# Patient Record
Sex: FEMALE | Race: BLACK OR AFRICAN AMERICAN | NOT HISPANIC OR LATINO | URBAN - METROPOLITAN AREA
[De-identification: names, ages, dates, MRNs, and addresses within clinical notes are randomized per-mention and may not be internally consistent; named-entity substitution may affect disease eponyms.]

---

## 2017-01-16 ENCOUNTER — EMERGENCY (EMERGENCY)
Facility: HOSPITAL | Age: 52
LOS: 1 days | Discharge: HOME | End: 2017-01-16
Attending: EMERGENCY MEDICINE | Admitting: EMERGENCY MEDICINE
Payer: COMMERCIAL

## 2017-01-16 VITALS
HEART RATE: 76 BPM | DIASTOLIC BLOOD PRESSURE: 80 MMHG | SYSTOLIC BLOOD PRESSURE: 133 MMHG | OXYGEN SATURATION: 100 % | TEMPERATURE: 98 F | RESPIRATION RATE: 16 BRPM

## 2017-01-16 VITALS
OXYGEN SATURATION: 100 % | DIASTOLIC BLOOD PRESSURE: 119 MMHG | TEMPERATURE: 99 F | SYSTOLIC BLOOD PRESSURE: 196 MMHG | HEART RATE: 74 BPM | RESPIRATION RATE: 18 BRPM

## 2017-01-16 DIAGNOSIS — Z98.89 OTHER SPECIFIED POSTPROCEDURAL STATES: Chronic | ICD-10-CM

## 2017-01-16 DIAGNOSIS — Z90.710 ACQUIRED ABSENCE OF BOTH CERVIX AND UTERUS: Chronic | ICD-10-CM

## 2017-01-16 LAB
ALBUMIN SERPL ELPH-MCNC: 4.3 G/DL — SIGNIFICANT CHANGE UP (ref 3.3–5)
ALP SERPL-CCNC: 67 U/L — SIGNIFICANT CHANGE UP (ref 40–120)
ALT FLD-CCNC: 32 U/L — SIGNIFICANT CHANGE UP (ref 4–33)
APTT BLD: 32.8 SEC — SIGNIFICANT CHANGE UP (ref 27.5–37.4)
AST SERPL-CCNC: 22 U/L — SIGNIFICANT CHANGE UP (ref 4–32)
BASOPHILS # BLD AUTO: 0.03 K/UL — SIGNIFICANT CHANGE UP (ref 0–0.2)
BASOPHILS NFR BLD AUTO: 0.4 % — SIGNIFICANT CHANGE UP (ref 0–2)
BILIRUB SERPL-MCNC: 0.3 MG/DL — SIGNIFICANT CHANGE UP (ref 0.2–1.2)
BUN SERPL-MCNC: 11 MG/DL — SIGNIFICANT CHANGE UP (ref 7–23)
CALCIUM SERPL-MCNC: 9.4 MG/DL — SIGNIFICANT CHANGE UP (ref 8.4–10.5)
CHLORIDE SERPL-SCNC: 105 MMOL/L — SIGNIFICANT CHANGE UP (ref 98–107)
CO2 SERPL-SCNC: 24 MMOL/L — SIGNIFICANT CHANGE UP (ref 22–31)
CREAT SERPL-MCNC: 0.75 MG/DL — SIGNIFICANT CHANGE UP (ref 0.5–1.3)
EOSINOPHIL # BLD AUTO: 0.13 K/UL — SIGNIFICANT CHANGE UP (ref 0–0.5)
EOSINOPHIL NFR BLD AUTO: 1.9 % — SIGNIFICANT CHANGE UP (ref 0–6)
GLUCOSE SERPL-MCNC: 102 MG/DL — HIGH (ref 70–99)
HCT VFR BLD CALC: 46.3 % — HIGH (ref 34.5–45)
HGB BLD-MCNC: 15.2 G/DL — SIGNIFICANT CHANGE UP (ref 11.5–15.5)
IMM GRANULOCYTES NFR BLD AUTO: 0.3 % — SIGNIFICANT CHANGE UP (ref 0–1.5)
INR BLD: 1.1 — SIGNIFICANT CHANGE UP (ref 0.87–1.18)
LYMPHOCYTES # BLD AUTO: 2.04 K/UL — SIGNIFICANT CHANGE UP (ref 1–3.3)
LYMPHOCYTES # BLD AUTO: 29.7 % — SIGNIFICANT CHANGE UP (ref 13–44)
MCHC RBC-ENTMCNC: 28.1 PG — SIGNIFICANT CHANGE UP (ref 27–34)
MCHC RBC-ENTMCNC: 32.8 % — SIGNIFICANT CHANGE UP (ref 32–36)
MCV RBC AUTO: 85.6 FL — SIGNIFICANT CHANGE UP (ref 80–100)
MONOCYTES # BLD AUTO: 0.43 K/UL — SIGNIFICANT CHANGE UP (ref 0–0.9)
MONOCYTES NFR BLD AUTO: 6.3 % — SIGNIFICANT CHANGE UP (ref 2–14)
NEUTROPHILS # BLD AUTO: 4.23 K/UL — SIGNIFICANT CHANGE UP (ref 1.8–7.4)
NEUTROPHILS NFR BLD AUTO: 61.4 % — SIGNIFICANT CHANGE UP (ref 43–77)
PLATELET # BLD AUTO: 265 K/UL — SIGNIFICANT CHANGE UP (ref 150–400)
PMV BLD: 12.3 FL — SIGNIFICANT CHANGE UP (ref 7–13)
POTASSIUM SERPL-MCNC: 4.3 MMOL/L — SIGNIFICANT CHANGE UP (ref 3.5–5.3)
POTASSIUM SERPL-SCNC: 4.3 MMOL/L — SIGNIFICANT CHANGE UP (ref 3.5–5.3)
PROT SERPL-MCNC: 8.2 G/DL — SIGNIFICANT CHANGE UP (ref 6–8.3)
PROTHROM AB SERPL-ACNC: 12.6 SEC — SIGNIFICANT CHANGE UP (ref 10–13.1)
RBC # BLD: 5.41 M/UL — HIGH (ref 3.8–5.2)
RBC # FLD: 12.7 % — SIGNIFICANT CHANGE UP (ref 10.3–14.5)
SODIUM SERPL-SCNC: 144 MMOL/L — SIGNIFICANT CHANGE UP (ref 135–145)
WBC # BLD: 6.88 K/UL — SIGNIFICANT CHANGE UP (ref 3.8–10.5)
WBC # FLD AUTO: 6.88 K/UL — SIGNIFICANT CHANGE UP (ref 3.8–10.5)

## 2017-01-16 PROCEDURE — 99284 EMERGENCY DEPT VISIT MOD MDM: CPT

## 2017-01-16 RX ORDER — ACETAMINOPHEN 500 MG
975 TABLET ORAL ONCE
Qty: 0 | Refills: 0 | Status: COMPLETED | OUTPATIENT
Start: 2017-01-16 | End: 2017-01-16

## 2017-01-16 RX ORDER — CEPHALEXIN 500 MG
1 CAPSULE ORAL
Qty: 14 | Refills: 0 | OUTPATIENT
Start: 2017-01-16 | End: 2017-01-23

## 2017-01-16 RX ORDER — LABETALOL HCL 100 MG
10 TABLET ORAL ONCE
Qty: 0 | Refills: 0 | Status: DISCONTINUED | OUTPATIENT
Start: 2017-01-16 | End: 2017-01-16

## 2017-01-16 RX ADMIN — Medication 975 MILLIGRAM(S): at 17:19

## 2017-01-16 NOTE — ED PROVIDER NOTE - OBJECTIVE STATEMENT
51 YOF PMH transcranial brain surgery on ABX prophylaxis p/w 3 hours of epistaxis today.  Associated hypertension.  Bleeding constant. Pt has tried pressure without relief. 51 YOF PMH transcranial brain surgery on ABX prophylaxis p/w 3 hours of epistaxis today.  Associated hypertension.  Bleeding constant. Pt has tried pressure without relief.  EMS called Pt brought to the ED.  No chest pain or SOB.  No trauma.

## 2017-01-16 NOTE — ED PROVIDER NOTE - ATTENDING CONTRIBUTION TO CARE
BECKY LAW, ATTENDING NOTE:  52 yo F brought in by EMS with past medical history of brain surgery on Abx prophylaxis presents with 3 hours of epistaxis from both nares today, currently only LEFT.  Patient tried pressure without relief..  No chest pain.  No trauma or injury.  Patient is awake and alert and in no acute distress.  Normocephalic/atraumatic.  Auricles are normal.  Neck supple. Anterior bleeding from the LEFT nostril. Lungs CTAB, no wheeze, no rhonchi,  no rales.  Heart is regular rate and rhythm.  Abdomen is soft, not distended +BS.  Back is nontender, no CVAT.  Moving all 4 extremities.   Neurologically grossly intact.  Affect is appropriate.   DR. LAW, ATTENDING MD-  I performed a face to face bedside interview with patient regarding history of present illness, review of symptoms and past medical history. I completed an independent physical exam.  I have discussed patient's plan of care with Dr. Pacheco.   I agree with note as stated above, having amended the EMR as needed to reflect my findings. I have discussed the assessment and plan of care.  This includes during the time I functioned as the attending physician for this patient.

## 2017-01-16 NOTE — ED ADULT NURSE NOTE - OBJECTIVE STATEMENT
Facilitator RN note: Received pt into spot #2. Pt A/o x 3 calm cooperative. Active bleeding from bilateral nostrils. Bright red blood noted to basin with clots. Pt denies being on any anticoagulants. Denies pain. Denies nausea, SOB or dizziness.. Pt eval by Dr. Santos. compression applied to nose by using 2 tongue depressors , applied by Dr. Santos. Bloodwork obtained and sent as ordered. Report given to primary RN.

## 2017-01-16 NOTE — ED ADULT TRIAGE NOTE - CHIEF COMPLAINT QUOTE
Pt c/o sudden onset epistaxis 1 hour ago. Pt hypertensive, reports compliance w/ meds. Denies anticoagulant use.  PMH: HTN

## 2017-01-16 NOTE — ED PROVIDER NOTE - MEDICAL DECISION MAKING DETAILS
51 YOF PMH transcranial brain surgery on ABX prophylaxis p/w 3 hours of epistaxis today. HTN.  VS otherwise stable.  Attempt pressure packing.  If no resolution, tamponade.  Blood pressure control as required.

## 2017-01-16 NOTE — ED PROVIDER NOTE - PROGRESS NOTE DETAILS
JW Anterior tamponade device placed.  Hemostasis achieved.  BP now 140 systolic.  Observe, reassess.  Likely discharge with ENT follow up. NATE Pt stable.  Pt discharged instructed to follow up with ENT in the next 24 hours.  Pt instructed to continue prophylaxis.  I reviewed the alarm symptoms of this patient's diagnosis and discussed criteria for their return to the emergency department.  I instructed the patient to return to the emergency department with any alarm symptoms for their specific diagnosis including fevers, chills, sweats, bleeding, and dizziness, any worsening symptoms, and any other concerns.  I instructed this patient to call their primary doctor today, to inform them of their visit to the emergency department, and to obtain a repeat evaluation in the next 24 hours.  This patient understood and agreed with our plan for follow up and felt safe returning home.  At the time of discharge this patient remained in stable condition, in no acute distress, with stable vital signs.

## 2017-01-17 ENCOUNTER — EMERGENCY (EMERGENCY)
Facility: HOSPITAL | Age: 52
LOS: 1 days | Discharge: HOME | End: 2017-01-17
Attending: EMERGENCY MEDICINE | Admitting: EMERGENCY MEDICINE
Payer: COMMERCIAL

## 2017-01-17 VITALS
SYSTOLIC BLOOD PRESSURE: 153 MMHG | DIASTOLIC BLOOD PRESSURE: 98 MMHG | OXYGEN SATURATION: 100 % | HEART RATE: 74 BPM | TEMPERATURE: 99 F | RESPIRATION RATE: 16 BRPM

## 2017-01-17 VITALS
DIASTOLIC BLOOD PRESSURE: 110 MMHG | TEMPERATURE: 99 F | OXYGEN SATURATION: 99 % | SYSTOLIC BLOOD PRESSURE: 173 MMHG | HEART RATE: 75 BPM | RESPIRATION RATE: 18 BRPM

## 2017-01-17 DIAGNOSIS — R51 HEADACHE: ICD-10-CM

## 2017-01-17 DIAGNOSIS — R04.0 EPISTAXIS: ICD-10-CM

## 2017-01-17 DIAGNOSIS — Z90.710 ACQUIRED ABSENCE OF BOTH CERVIX AND UTERUS: Chronic | ICD-10-CM

## 2017-01-17 DIAGNOSIS — Z98.89 OTHER SPECIFIED POSTPROCEDURAL STATES: Chronic | ICD-10-CM

## 2017-01-17 DIAGNOSIS — Z88.0 ALLERGY STATUS TO PENICILLIN: ICD-10-CM

## 2017-01-17 DIAGNOSIS — E78.00 PURE HYPERCHOLESTEROLEMIA, UNSPECIFIED: ICD-10-CM

## 2017-01-17 DIAGNOSIS — Z90.710 ACQUIRED ABSENCE OF BOTH CERVIX AND UTERUS: ICD-10-CM

## 2017-01-17 DIAGNOSIS — I10 ESSENTIAL (PRIMARY) HYPERTENSION: ICD-10-CM

## 2017-01-17 DIAGNOSIS — Z98.890 OTHER SPECIFIED POSTPROCEDURAL STATES: ICD-10-CM

## 2017-01-17 PROCEDURE — 70450 CT HEAD/BRAIN W/O DYE: CPT

## 2017-01-17 PROCEDURE — 99284 EMERGENCY DEPT VISIT MOD MDM: CPT | Mod: 25

## 2017-01-17 PROCEDURE — 70450 CT HEAD/BRAIN W/O DYE: CPT | Mod: 26

## 2017-01-17 PROCEDURE — 99284 EMERGENCY DEPT VISIT MOD MDM: CPT

## 2017-01-17 RX ORDER — ACETAMINOPHEN 500 MG
975 TABLET ORAL ONCE
Qty: 0 | Refills: 0 | Status: COMPLETED | OUTPATIENT
Start: 2017-01-17 | End: 2017-01-17

## 2017-01-17 RX ADMIN — Medication 975 MILLIGRAM(S): at 18:16

## 2017-01-17 NOTE — ED PROVIDER NOTE - PROGRESS NOTE DETAILS
Patient bleeding stopped. went for ct head. Patient returned from ct and coughed and left rhino rocket came out. Patient nasal spray phenylephrine placed in left nare with pressure. ENT at bedside and scoped patient. there is mid/distal bleeding source. patient repacked by ENT. Patient has had no additional bleeding.

## 2017-01-17 NOTE — ED ADULT NURSE NOTE - ADDITIONAL PRINTED INSTRUCTIONS GIVEN
** seen by ENT attending, no further bleeding noted,  present during ENT consult, vitals stable, ambualtory, steady gait, denies c/o

## 2017-01-17 NOTE — ED PROVIDER NOTE - MEDICAL DECISION MAKING DETAILS
Kendall: 51 year old female with epistaxis and sent to ER for ct head to r/o recurrence of tumor.   will get ct head, patient restarted bleeding in ER. will apply pressure.

## 2017-01-17 NOTE — ED PROVIDER NOTE - NS ED MD SCRIBE ATTENDING SCRIBE SECTIONS
PAST MEDICAL/SURGICAL/SOCIAL HISTORY/HISTORY OF PRESENT ILLNESS/REVIEW OF SYSTEMS/OBSERVATION MONITORING PLAN/PHYSICAL EXAM/HIV/VITAL SIGNS( Pullset)

## 2017-01-17 NOTE — ED PROVIDER NOTE - CONSTITUTIONAL, MLM
normal... Well appearing, well nourished, awake, alert, oriented to person, place, time/situation and in mild distress. well nourished, awake, alert, oriented to person, place, time/situation and in mild distress.

## 2017-01-17 NOTE — ED ADULT NURSE REASSESSMENT NOTE - NS ED NURSE REASSESS COMMENT FT1
resting in bed,  present, vitals stable, dispo pending, L nare packing in place, denies further epistaxis, neuro exam wnl, wants to take keppra 750 mg

## 2017-01-17 NOTE — ED ADULT NURSE NOTE - OBJECTIVE STATEMENT
pt present to m/w from md office pt had left nare packed at [facility] yesterday for epistaxis pt hx 2 brain surgeries for benign tumor hx htn presents with htn h/a and feels continued blood dripping posterior throat no chest pain denies sob

## 2017-01-17 NOTE — ED PROVIDER NOTE - OBJECTIVE STATEMENT
52 y/o female with PMHx of HLD, HTN and Benign Brain Tumor presents to the ED c/o epistaxis and HA x1 day. Pt reports she was seen yesterday at [facility] where they packed her left nostril. Reports she saw her PMD who advised the pt to come to the ED for a CT scan to rule out a possible recurrent brain tumor. Reports the blood is dripping down into her throat.  Reports she's had two brain surgeries in 2009 and 2013 for tumor removal and was given full body radiation in 2013. Denies fever, chills, n/v/d.   MEDS: Divan, Atenolol, and Keppra. 50 y/o female with PMHx of HTN, HLD, and Benign Brain Tumor s/p craniotomy (2013) presents to the ED c/o epistaxis and HA x1 day. Pt reports she was seen yesterday at [facility] where they packed her left nostril with rhinorocket for epistaxis. Reports she saw her PMD who advised the pt to come to the ED for a CT scan to evaluate brain. Reports the blood is dripping down into her throat that just started while in ER.  Reports she's had two craniotomies in 2009 and 2013 and  radiation in 2013. Denies fever, chills, n/v/d.   MEDS: Divan, Atenolol, and Keppra.

## 2017-04-12 ENCOUNTER — EMERGENCY (EMERGENCY)
Facility: HOSPITAL | Age: 52
LOS: 1 days | Discharge: HOME | End: 2017-04-12
Attending: EMERGENCY MEDICINE | Admitting: EMERGENCY MEDICINE
Payer: COMMERCIAL

## 2017-04-12 VITALS
HEART RATE: 76 BPM | TEMPERATURE: 98 F | OXYGEN SATURATION: 97 % | DIASTOLIC BLOOD PRESSURE: 93 MMHG | RESPIRATION RATE: 18 BRPM | SYSTOLIC BLOOD PRESSURE: 151 MMHG

## 2017-04-12 VITALS
DIASTOLIC BLOOD PRESSURE: 96 MMHG | TEMPERATURE: 98 F | RESPIRATION RATE: 16 BRPM | HEART RATE: 64 BPM | SYSTOLIC BLOOD PRESSURE: 158 MMHG | OXYGEN SATURATION: 98 %

## 2017-04-12 DIAGNOSIS — R09.81 NASAL CONGESTION: ICD-10-CM

## 2017-04-12 DIAGNOSIS — Z90.710 ACQUIRED ABSENCE OF BOTH CERVIX AND UTERUS: Chronic | ICD-10-CM

## 2017-04-12 DIAGNOSIS — Z98.89 OTHER SPECIFIED POSTPROCEDURAL STATES: Chronic | ICD-10-CM

## 2017-04-12 LAB
ALBUMIN SERPL ELPH-MCNC: 4.1 G/DL — SIGNIFICANT CHANGE UP (ref 3.3–5)
ALP SERPL-CCNC: 69 U/L — SIGNIFICANT CHANGE UP (ref 40–120)
ALT FLD-CCNC: 23 U/L RC — SIGNIFICANT CHANGE UP (ref 10–45)
ANION GAP SERPL CALC-SCNC: 11 MMOL/L — SIGNIFICANT CHANGE UP (ref 5–17)
AST SERPL-CCNC: 20 U/L — SIGNIFICANT CHANGE UP (ref 10–40)
BASOPHILS # BLD AUTO: 0.1 K/UL — SIGNIFICANT CHANGE UP (ref 0–0.2)
BASOPHILS NFR BLD AUTO: 0.7 % — SIGNIFICANT CHANGE UP (ref 0–2)
BILIRUB SERPL-MCNC: 0.2 MG/DL — SIGNIFICANT CHANGE UP (ref 0.2–1.2)
BUN SERPL-MCNC: 13 MG/DL — SIGNIFICANT CHANGE UP (ref 7–23)
CALCIUM SERPL-MCNC: 9.2 MG/DL — SIGNIFICANT CHANGE UP (ref 8.4–10.5)
CHLORIDE SERPL-SCNC: 102 MMOL/L — SIGNIFICANT CHANGE UP (ref 96–108)
CO2 SERPL-SCNC: 26 MMOL/L — SIGNIFICANT CHANGE UP (ref 22–31)
CREAT SERPL-MCNC: 0.77 MG/DL — SIGNIFICANT CHANGE UP (ref 0.5–1.3)
EOSINOPHIL # BLD AUTO: 0.3 K/UL — SIGNIFICANT CHANGE UP (ref 0–0.5)
EOSINOPHIL NFR BLD AUTO: 3.3 % — SIGNIFICANT CHANGE UP (ref 0–6)
GLUCOSE SERPL-MCNC: 123 MG/DL — HIGH (ref 70–99)
HCT VFR BLD CALC: 45.8 % — HIGH (ref 34.5–45)
HGB BLD-MCNC: 14.9 G/DL — SIGNIFICANT CHANGE UP (ref 11.5–15.5)
LYMPHOCYTES # BLD AUTO: 2.8 K/UL — SIGNIFICANT CHANGE UP (ref 1–3.3)
LYMPHOCYTES # BLD AUTO: 34.8 % — SIGNIFICANT CHANGE UP (ref 13–44)
MCHC RBC-ENTMCNC: 26.3 PG — LOW (ref 27–34)
MCHC RBC-ENTMCNC: 32.7 GM/DL — SIGNIFICANT CHANGE UP (ref 32–36)
MCV RBC AUTO: 80.6 FL — SIGNIFICANT CHANGE UP (ref 80–100)
MONOCYTES # BLD AUTO: 0.6 K/UL — SIGNIFICANT CHANGE UP (ref 0–0.9)
MONOCYTES NFR BLD AUTO: 8 % — SIGNIFICANT CHANGE UP (ref 2–14)
NEUTROPHILS # BLD AUTO: 4.2 K/UL — SIGNIFICANT CHANGE UP (ref 1.8–7.4)
NEUTROPHILS NFR BLD AUTO: 53.1 % — SIGNIFICANT CHANGE UP (ref 43–77)
PLATELET # BLD AUTO: 223 K/UL — SIGNIFICANT CHANGE UP (ref 150–400)
POTASSIUM SERPL-MCNC: 4.1 MMOL/L — SIGNIFICANT CHANGE UP (ref 3.5–5.3)
POTASSIUM SERPL-SCNC: 4.1 MMOL/L — SIGNIFICANT CHANGE UP (ref 3.5–5.3)
PROT SERPL-MCNC: 8.1 G/DL — SIGNIFICANT CHANGE UP (ref 6–8.3)
RBC # BLD: 5.68 M/UL — HIGH (ref 3.8–5.2)
RBC # FLD: 13 % — SIGNIFICANT CHANGE UP (ref 10.3–14.5)
SODIUM SERPL-SCNC: 139 MMOL/L — SIGNIFICANT CHANGE UP (ref 135–145)
WBC # BLD: 7.9 K/UL — SIGNIFICANT CHANGE UP (ref 3.8–10.5)
WBC # FLD AUTO: 7.9 K/UL — SIGNIFICANT CHANGE UP (ref 3.8–10.5)

## 2017-04-12 PROCEDURE — 71046 X-RAY EXAM CHEST 2 VIEWS: CPT

## 2017-04-12 PROCEDURE — 99283 EMERGENCY DEPT VISIT LOW MDM: CPT | Mod: 25

## 2017-04-12 PROCEDURE — 99284 EMERGENCY DEPT VISIT MOD MDM: CPT

## 2017-04-12 PROCEDURE — 80053 COMPREHEN METABOLIC PANEL: CPT

## 2017-04-12 PROCEDURE — 71020: CPT | Mod: 26

## 2017-04-12 PROCEDURE — 85027 COMPLETE CBC AUTOMATED: CPT

## 2017-04-12 RX ORDER — CETIRIZINE HYDROCHLORIDE, PSEUDOEPHEDRINE HYDROCHLORIDE 5; 120 MG/1; MG/1
1 TABLET, FILM COATED, EXTENDED RELEASE ORAL
Qty: 14 | Refills: 0 | OUTPATIENT
Start: 2017-04-12 | End: 2017-04-19

## 2017-04-12 RX ORDER — OXYMETAZOLINE HYDROCHLORIDE 0.5 MG/ML
2 SPRAY NASAL
Qty: 1 | Refills: 0 | OUTPATIENT
Start: 2017-04-12 | End: 2017-04-15

## 2017-04-12 NOTE — ED PROVIDER NOTE - MEDICAL DECISION MAKING DETAILS
Mihailos: 52 yo F h/o HTN, benign brain tumor, HLD p/w nasal congestion and dry cough x 1 week. s/p azithromycin and promethazine by PMD last week without improvement. No HA, dizziness, chills or fever, rigor, sob, cp, n/v, abdominal pain.   -pt is well appearing with normal vitals; will d/c with instructions for afrin x 3 days and zyrtec to follow with pmd. Pt understands and agrees.

## 2017-04-12 NOTE — ED ADULT NURSE NOTE - OBJECTIVE STATEMENT
51 y.o female pmh HTN, benign brain tumor, high cholesterol c/o worsening congestion and cough x 1 week. pt states she was placed on abx by her pcp and has not been getting any better. called her pcp today and was told to come to the ED for further evaluation and to have a chest xray done. pt denies any current cp, sob, weakness, dizziness, chills or body aches. denies any recent sick contacts. no fevers present upon assessment. lungs clear bilaterally with mild expiratory wheezing. VS stable. safety precautions maintained.  at bedside.

## 2017-04-12 NOTE — ED PROVIDER NOTE - OBJECTIVE STATEMENT
51yF h/o HTN, benign brain tumor, HLD presents with worsening congestion and dry cough x 1 week. s/p azithromycin and promethazine by PMD last week without improvement. No chills or fever. Normal PO intake. otherwise feeling well. Never smoker.

## 2017-04-26 ENCOUNTER — EMERGENCY (EMERGENCY)
Facility: HOSPITAL | Age: 52
LOS: 1 days | Discharge: HOME | End: 2017-04-26
Attending: EMERGENCY MEDICINE | Admitting: EMERGENCY MEDICINE
Payer: COMMERCIAL

## 2017-04-26 VITALS
TEMPERATURE: 98 F | OXYGEN SATURATION: 100 % | HEIGHT: 60 IN | RESPIRATION RATE: 20 BRPM | HEART RATE: 82 BPM | SYSTOLIC BLOOD PRESSURE: 187 MMHG | DIASTOLIC BLOOD PRESSURE: 116 MMHG

## 2017-04-26 VITALS — SYSTOLIC BLOOD PRESSURE: 184 MMHG | DIASTOLIC BLOOD PRESSURE: 90 MMHG

## 2017-04-26 DIAGNOSIS — Z98.89 OTHER SPECIFIED POSTPROCEDURAL STATES: Chronic | ICD-10-CM

## 2017-04-26 DIAGNOSIS — Z90.710 ACQUIRED ABSENCE OF BOTH CERVIX AND UTERUS: Chronic | ICD-10-CM

## 2017-04-26 PROCEDURE — 99284 EMERGENCY DEPT VISIT MOD MDM: CPT | Mod: 25

## 2017-04-26 PROCEDURE — 99283 EMERGENCY DEPT VISIT LOW MDM: CPT | Mod: 25

## 2017-04-26 PROCEDURE — 94640 AIRWAY INHALATION TREATMENT: CPT

## 2017-04-26 RX ORDER — IPRATROPIUM/ALBUTEROL SULFATE 18-103MCG
3 AEROSOL WITH ADAPTER (GRAM) INHALATION ONCE
Qty: 0 | Refills: 0 | Status: DISCONTINUED | OUTPATIENT
Start: 2017-04-26 | End: 2017-04-30

## 2017-04-26 RX ORDER — ACETAMINOPHEN 500 MG
975 TABLET ORAL ONCE
Qty: 0 | Refills: 0 | Status: DISCONTINUED | OUTPATIENT
Start: 2017-04-26 | End: 2017-04-30

## 2017-04-26 NOTE — ED ADULT NURSE REASSESSMENT NOTE - NS ED NURSE REASSESS COMMENT FT1
Pt d/c to home after receiving nebulizer. Pt states that she does not want to review discharge paperwork stating "you guys didn't do anything for me". Pt given number to follow up w/ pulmonologist. Safety and comfort maintained while in ED. Pt BP decreased. Pt took home dose of AM BP medication.

## 2017-04-26 NOTE — ED PROVIDER NOTE - MEDICAL DECISION MAKING DETAILS
51 year old female with weeks of cough despite treatment for pneumonia, GERD, and allergies. Will treat for bronchitis and advise PMD follow up.

## 2017-04-26 NOTE — ED ADULT NURSE NOTE - OBJECTIVE STATEMENT
Pt presents to ED c/o cough x 3 weeks. Pt seen in ED 2 weeks ago for same complaint and d/c home to follow up w/ pulmonologist. Pt denies fever/chills, SOB, n/v, no numbness/tingling in extremities. Pt reports having h/a since yesterday and states "my pressure is higher than normal". Pt has pmh HTN, did not take AM medication prior to presenting to ED. Pt neuro exam intact, no deficits noted. Pt right eyelid drooped, baseline as per pt .

## 2017-04-26 NOTE — ED PROVIDER NOTE - OBJECTIVE STATEMENT
51 year old female with PMHx of HTN, "benign brain tumor," HLD, never smoker, p/w 3 weeks of dry cough, no fever/chills, no chest pain, no shortness of breath, no vomiting, failed outpatient treatment with Z-pack, promethazine, Afrin, Zyrtec, and omeprazole. Seen at this ED 2 weeks ago and had blood work and CXR which were normal.

## 2017-04-26 NOTE — ED PROVIDER NOTE - CARE PLAN
Principal Discharge DX:	Cough  Instructions for follow-up, activity and diet:	Take Tylenol 1000 mg or ibuprofen 600 mg every 6 hours with food as needed for pain. Use an inhaler as needed for symptoms. Follow up with your primary doctor and with a pulmonologist - call Dr. William for an appointment. Return to the Emergency Dept if you develop any new or worsening symptoms Principal Discharge DX:	Cough  Instructions for follow-up, activity and diet:	Take Tylenol 1000 mg or ibuprofen 600 mg every 6 hours with food as needed for pain. Use an inhaler as needed for symptoms. Follow up with your primary doctor and with a pulmonologist - call Dr. William for an appointment. Return to the Emergency Dept if you develop any new or worsening symptoms  Secondary Diagnosis:	Anxiety about health

## 2017-04-26 NOTE — ED PROVIDER NOTE - PLAN OF CARE
Take Tylenol 1000 mg or ibuprofen 600 mg every 6 hours with food as needed for pain. Use an inhaler as needed for symptoms. Follow up with your primary doctor and with a pulmonologist - call Dr. William for an appointment. Return to the Emergency Dept if you develop any new or worsening symptoms

## 2017-04-26 NOTE — ED PROVIDER NOTE - ATTENDING CONTRIBUTION TO CARE
51 year old female with PMHx of HTN, "benign brain tumor," HLD, never smoker, p/w 3 weeks of dry cough, no fever/chills, no chest pain, no shortness of breath, no vomiting, failed outpatient treatment with Z-pack, promethazine, Afrin, Zyrtec, and omeprazole. Seen at this ED 2 weeks ago and had blood work and CXR which were normal.  ctab, no pain over sinuses, afebrile, ncat, cooperative, with capacity and insight, no warmth/erythema to skin, trachea midline.   will treat for 'cough' but patient not coughing in ed, cn grossly intact, will afford  follow up with pulmonology.   No immediate life threatening issues present on history or clinical exam. Patient is a safe disposition home, has capacity and insight into their condition, ambulatory in the emergency department and will follow up with their doctor(s) this week. Patient  understands anticipatory guidance and was given strict return and follow up precautions. The patient has been informed of all concerning signs and symptoms to return to Emergency Department, the necessity to follow up with PMD/Clinic/follow up provided within 2-3 days was explained, and the patient reports understanding of above with capacity and insight.

## 2017-04-28 ENCOUNTER — APPOINTMENT (OUTPATIENT)
Dept: PULMONOLOGY | Facility: CLINIC | Age: 52
End: 2017-04-28

## 2017-04-28 VITALS
OXYGEN SATURATION: 95 % | BODY MASS INDEX: 32.79 KG/M2 | RESPIRATION RATE: 16 BRPM | DIASTOLIC BLOOD PRESSURE: 70 MMHG | TEMPERATURE: 97.8 F | HEART RATE: 68 BPM | SYSTOLIC BLOOD PRESSURE: 120 MMHG | HEIGHT: 60 IN | WEIGHT: 167 LBS

## 2017-04-28 DIAGNOSIS — R05 COUGH: ICD-10-CM

## 2017-04-28 RX ORDER — FLUTICASONE PROPIONATE 50 UG/1
50 SPRAY, METERED NASAL TWICE DAILY
Qty: 1 | Refills: 5 | Status: ACTIVE | COMMUNITY
Start: 2017-04-28 | End: 1900-01-01

## 2017-04-28 RX ORDER — LIDOCAINE AND PRILOCAINE 25; 25 MG/G; MG/G
2.5-2.5 CREAM TOPICAL
Qty: 30 | Refills: 0 | Status: ACTIVE | COMMUNITY
Start: 2016-12-05

## 2017-04-28 RX ORDER — PROMETHAZINE HYDROCHLORIDE AND DEXTROMETHORPHAN HYDROBROMIDE ORAL SOLUTION 15; 6.25 MG/5ML; MG/5ML
6.25-15 SOLUTION ORAL
Qty: 600 | Refills: 0 | Status: DISCONTINUED | COMMUNITY
Start: 2017-04-06

## 2017-04-28 RX ORDER — OMEPRAZOLE 40 MG/1
40 CAPSULE, DELAYED RELEASE ORAL
Qty: 30 | Refills: 0 | Status: DISCONTINUED | COMMUNITY
Start: 2017-04-17

## 2017-05-18 NOTE — ED PROVIDER NOTE - ENMT, MLM
Patient was called at 3:00pm regarding this matter. See phone note. Left nares is packed. Mild oozing of bright red blood from right nares. Mild bright red blood in oral pharynx. Left nare is packed. Mild oozing of bright red blood from right nare. Mild bright red blood in oral pharynx.

## 2017-07-17 ENCOUNTER — EMERGENCY (EMERGENCY)
Facility: HOSPITAL | Age: 52
LOS: 1 days | Discharge: HOME | End: 2017-07-17
Attending: EMERGENCY MEDICINE | Admitting: EMERGENCY MEDICINE
Payer: COMMERCIAL

## 2017-07-17 VITALS
OXYGEN SATURATION: 100 % | RESPIRATION RATE: 16 BRPM | SYSTOLIC BLOOD PRESSURE: 160 MMHG | HEART RATE: 76 BPM | DIASTOLIC BLOOD PRESSURE: 100 MMHG

## 2017-07-17 VITALS
DIASTOLIC BLOOD PRESSURE: 105 MMHG | SYSTOLIC BLOOD PRESSURE: 154 MMHG | TEMPERATURE: 98 F | OXYGEN SATURATION: 100 % | RESPIRATION RATE: 16 BRPM | HEART RATE: 98 BPM

## 2017-07-17 DIAGNOSIS — Z98.89 OTHER SPECIFIED POSTPROCEDURAL STATES: Chronic | ICD-10-CM

## 2017-07-17 DIAGNOSIS — Z90.710 ACQUIRED ABSENCE OF BOTH CERVIX AND UTERUS: Chronic | ICD-10-CM

## 2017-07-17 LAB
APPEARANCE UR: CLEAR — SIGNIFICANT CHANGE UP
BILIRUB UR-MCNC: NEGATIVE — SIGNIFICANT CHANGE UP
BLOOD UR QL VISUAL: NEGATIVE — SIGNIFICANT CHANGE UP
COLOR SPEC: YELLOW — SIGNIFICANT CHANGE UP
GLUCOSE UR-MCNC: NEGATIVE — SIGNIFICANT CHANGE UP
KETONES UR-MCNC: NEGATIVE — SIGNIFICANT CHANGE UP
LEUKOCYTE ESTERASE UR-ACNC: NEGATIVE — SIGNIFICANT CHANGE UP
MUCOUS THREADS # UR AUTO: SIGNIFICANT CHANGE UP
NITRITE UR-MCNC: NEGATIVE — SIGNIFICANT CHANGE UP
PH UR: 6 — SIGNIFICANT CHANGE UP (ref 4.6–8)
PROT UR-MCNC: 20 — SIGNIFICANT CHANGE UP
RBC CASTS # UR COMP ASSIST: SIGNIFICANT CHANGE UP (ref 0–?)
SP GR SPEC: 1.02 — SIGNIFICANT CHANGE UP (ref 1–1.03)
SQUAMOUS # UR AUTO: SIGNIFICANT CHANGE UP
UROBILINOGEN FLD QL: NORMAL E.U. — SIGNIFICANT CHANGE UP (ref 0.1–0.2)
WBC UR QL: SIGNIFICANT CHANGE UP (ref 0–?)

## 2017-07-17 PROCEDURE — 99285 EMERGENCY DEPT VISIT HI MDM: CPT | Mod: 25

## 2017-07-17 RX ORDER — SODIUM CHLORIDE 9 MG/ML
1000 INJECTION INTRAMUSCULAR; INTRAVENOUS; SUBCUTANEOUS ONCE
Qty: 0 | Refills: 0 | Status: COMPLETED | OUTPATIENT
Start: 2017-07-17 | End: 2017-07-17

## 2017-07-17 RX ORDER — KETOROLAC TROMETHAMINE 30 MG/ML
15 SYRINGE (ML) INJECTION ONCE
Qty: 0 | Refills: 0 | Status: DISCONTINUED | OUTPATIENT
Start: 2017-07-17 | End: 2017-07-17

## 2017-07-17 RX ADMIN — Medication 15 MILLIGRAM(S): at 07:38

## 2017-07-17 RX ADMIN — Medication 15 MILLIGRAM(S): at 07:39

## 2017-07-17 RX ADMIN — SODIUM CHLORIDE 1000 MILLILITER(S): 9 INJECTION INTRAMUSCULAR; INTRAVENOUS; SUBCUTANEOUS at 07:32

## 2017-07-17 RX ADMIN — Medication 15 MILLIGRAM(S): at 07:20

## 2017-07-17 NOTE — ED ADULT TRIAGE NOTE - CHIEF COMPLAINT QUOTE
c/o left sided flank pain and lower back pain. also has a pressure/pain in urethra. hx of kidney stone. denies burning urination or hematuria. appears comfortable in triage.

## 2017-07-17 NOTE — ED PROVIDER NOTE - PROGRESS NOTE DETAILS
Pt signed out to me, reassessed. Had just received pain medication, no relief yet but appears comfortable. Reports R sided flank pain x last night that feels like same kidney stones has had in the past, had CT scan last year for stones. No hx of stones requiring surgical intervention. No hematuria or dysuria. Pt well appearing, heart RRR, abd soft, NTND, no CVA tenderness. Bedside sono showing mild left sided hydronephrosis, normal R kidney. Pt pending UA result, analgesia. If effective and no sign of infection on UA, d/c home with urology f/u. UA negative for infection, instructed to take ibuprofen and acetaminophen for pain at home, given copy of urinalysis results and urology f/u list. Pain well controlled after IVF and toradol. Pt signed out to me, reassessed. Had just received pain medication, no relief yet but appears comfortable. Reports R sided flank pain x last night that feels like same kidney stones has had in the past, had CT scan last year for stones. No hx of stones requiring surgical intervention. No hematuria or dysuria. Pt well appearing, heart RRR, abd soft, NTND, no CVA tenderness. Bedside sono showing mild left sided hydronephrosis, normal R kidney. Pt pending UA result, analgesia. If effective and no sign of infection on UA, d/c home with urology f/u.   Brent, PGY-3

## 2017-07-17 NOTE — ED PROVIDER NOTE - FAMILY HISTORY
Mother  Still living? Yes, Estimated age: Age Unknown  Family history of glaucoma, Age at diagnosis: Age Unknown  Family history of hypertension, Age at diagnosis: Age Unknown     Grandparent  Still living? Unknown  Family history of breast cancer, Age at diagnosis: Age Unknown     Aunt  Still living? Unknown  Family history of breast cancer, Age at diagnosis: Age Unknown

## 2017-07-17 NOTE — ED PROVIDER NOTE - MEDICAL DECISION MAKING DETAILS
51 F who presents with flank pain likely secondary to nephrolithiasis. Will give 1 L NS, ketorolac 15 mg, check UA and urine hCG. Will check bedside U/S and possible CT scan.

## 2017-07-17 NOTE — ED PROVIDER NOTE - ATTENDING CONTRIBUTION TO CARE
pt with history of stones with typical flank pain/stone pain.  Never needed to be intervened upon in the past.  Will ensure proper response to pain meds and check UA for infection.  Will only CT if no response to pain meds or signs of infection due to age.

## 2017-07-17 NOTE — ED PROVIDER NOTE - OBJECTIVE STATEMENT
51-year-old female with history of HTN, brain mass who presents to the ED with flank pain. The patient has had pain in her left flank and pelvic region for the past 3-4 days that has progressively worsened. She denies hematuria, dysuria, fevers, chills, nausea. She had an episode of nephrolithiasis last year.

## 2017-07-17 NOTE — ED ADULT NURSE NOTE - OBJECTIVE STATEMENT
pt received to room 20, AAOx3, c/o left sided flank pain and lower back pain. Hx- kidney stone. pt denies hematuria, dysuria. denies abdominal pain/nausea/ vomiting. VS as noted, pt in NAD, 20G IV placed to rt. AC, pt medicated per orders, UA sent, will continue to monitor pt.

## 2017-12-11 ENCOUNTER — APPOINTMENT (OUTPATIENT)
Dept: OBGYN | Facility: CLINIC | Age: 52
End: 2017-12-11
Payer: COMMERCIAL

## 2017-12-11 VITALS
BODY MASS INDEX: 32.59 KG/M2 | HEART RATE: 79 BPM | HEIGHT: 60 IN | WEIGHT: 166 LBS | DIASTOLIC BLOOD PRESSURE: 84 MMHG | SYSTOLIC BLOOD PRESSURE: 140 MMHG

## 2017-12-11 PROCEDURE — 99396 PREV VISIT EST AGE 40-64: CPT

## 2017-12-15 LAB
CYTOLOGY CVX/VAG DOC THIN PREP: NORMAL
HPV HIGH+LOW RISK DNA PNL CVX: NOT DETECTED

## 2018-12-17 ENCOUNTER — EMERGENCY (EMERGENCY)
Facility: HOSPITAL | Age: 53
LOS: 1 days | Discharge: HOME | End: 2018-12-17
Attending: EMERGENCY MEDICINE | Admitting: EMERGENCY MEDICINE
Payer: COMMERCIAL

## 2018-12-17 ENCOUNTER — APPOINTMENT (OUTPATIENT)
Dept: OBGYN | Facility: CLINIC | Age: 53
End: 2018-12-17
Payer: COMMERCIAL

## 2018-12-17 VITALS
HEART RATE: 77 BPM | DIASTOLIC BLOOD PRESSURE: 114 MMHG | SYSTOLIC BLOOD PRESSURE: 187 MMHG | WEIGHT: 167 LBS | HEIGHT: 60 IN | BODY MASS INDEX: 32.79 KG/M2

## 2018-12-17 VITALS
SYSTOLIC BLOOD PRESSURE: 209 MMHG | OXYGEN SATURATION: 100 % | RESPIRATION RATE: 18 BRPM | HEART RATE: 76 BPM | TEMPERATURE: 98 F | DIASTOLIC BLOOD PRESSURE: 110 MMHG

## 2018-12-17 VITALS — SYSTOLIC BLOOD PRESSURE: 186 MMHG | DIASTOLIC BLOOD PRESSURE: 111 MMHG

## 2018-12-17 VITALS
DIASTOLIC BLOOD PRESSURE: 94 MMHG | RESPIRATION RATE: 18 BRPM | OXYGEN SATURATION: 99 % | SYSTOLIC BLOOD PRESSURE: 192 MMHG | HEART RATE: 78 BPM

## 2018-12-17 DIAGNOSIS — Z90.710 ACQUIRED ABSENCE OF BOTH CERVIX AND UTERUS: Chronic | ICD-10-CM

## 2018-12-17 DIAGNOSIS — Z98.89 OTHER SPECIFIED POSTPROCEDURAL STATES: Chronic | ICD-10-CM

## 2018-12-17 LAB
ALBUMIN SERPL ELPH-MCNC: 4.2 G/DL — SIGNIFICANT CHANGE UP (ref 3.3–5)
ALP SERPL-CCNC: 83 U/L — SIGNIFICANT CHANGE UP (ref 40–120)
ALT FLD-CCNC: 22 U/L — SIGNIFICANT CHANGE UP (ref 4–33)
APTT BLD: 38.5 SEC — HIGH (ref 27.5–36.3)
AST SERPL-CCNC: 22 U/L — SIGNIFICANT CHANGE UP (ref 4–32)
BASOPHILS # BLD AUTO: 0.08 K/UL — SIGNIFICANT CHANGE UP (ref 0–0.2)
BASOPHILS NFR BLD AUTO: 1.2 % — SIGNIFICANT CHANGE UP (ref 0–2)
BILIRUB SERPL-MCNC: 0.3 MG/DL — SIGNIFICANT CHANGE UP (ref 0.2–1.2)
BUN SERPL-MCNC: 8 MG/DL — SIGNIFICANT CHANGE UP (ref 7–23)
CALCIUM SERPL-MCNC: 9.2 MG/DL — SIGNIFICANT CHANGE UP (ref 8.4–10.5)
CHLORIDE SERPL-SCNC: 102 MMOL/L — SIGNIFICANT CHANGE UP (ref 98–107)
CO2 SERPL-SCNC: 27 MMOL/L — SIGNIFICANT CHANGE UP (ref 22–31)
CREAT SERPL-MCNC: 0.67 MG/DL — SIGNIFICANT CHANGE UP (ref 0.5–1.3)
EOSINOPHIL # BLD AUTO: 0.25 K/UL — SIGNIFICANT CHANGE UP (ref 0–0.5)
EOSINOPHIL NFR BLD AUTO: 3.7 % — SIGNIFICANT CHANGE UP (ref 0–6)
GLUCOSE SERPL-MCNC: 111 MG/DL — HIGH (ref 70–99)
HCT VFR BLD CALC: 49.6 % — HIGH (ref 34.5–45)
HGB BLD-MCNC: 15.9 G/DL — HIGH (ref 11.5–15.5)
IMM GRANULOCYTES # BLD AUTO: 0.02 # — SIGNIFICANT CHANGE UP
IMM GRANULOCYTES NFR BLD AUTO: 0.3 % — SIGNIFICANT CHANGE UP (ref 0–1.5)
INR BLD: 1.07 — SIGNIFICANT CHANGE UP (ref 0.88–1.17)
LYMPHOCYTES # BLD AUTO: 2.35 K/UL — SIGNIFICANT CHANGE UP (ref 1–3.3)
LYMPHOCYTES # BLD AUTO: 34.6 % — SIGNIFICANT CHANGE UP (ref 13–44)
MCHC RBC-ENTMCNC: 27.1 PG — SIGNIFICANT CHANGE UP (ref 27–34)
MCHC RBC-ENTMCNC: 32.1 % — SIGNIFICANT CHANGE UP (ref 32–36)
MCV RBC AUTO: 84.5 FL — SIGNIFICANT CHANGE UP (ref 80–100)
MONOCYTES # BLD AUTO: 0.56 K/UL — SIGNIFICANT CHANGE UP (ref 0–0.9)
MONOCYTES NFR BLD AUTO: 8.2 % — SIGNIFICANT CHANGE UP (ref 2–14)
NEUTROPHILS # BLD AUTO: 3.54 K/UL — SIGNIFICANT CHANGE UP (ref 1.8–7.4)
NEUTROPHILS NFR BLD AUTO: 52 % — SIGNIFICANT CHANGE UP (ref 43–77)
NRBC # FLD: 0 — SIGNIFICANT CHANGE UP
PLATELET # BLD AUTO: 259 K/UL — SIGNIFICANT CHANGE UP (ref 150–400)
PMV BLD: 12.1 FL — SIGNIFICANT CHANGE UP (ref 7–13)
POTASSIUM SERPL-MCNC: 4.2 MMOL/L — SIGNIFICANT CHANGE UP (ref 3.5–5.3)
POTASSIUM SERPL-SCNC: 4.2 MMOL/L — SIGNIFICANT CHANGE UP (ref 3.5–5.3)
PROT SERPL-MCNC: 8.7 G/DL — HIGH (ref 6–8.3)
PROTHROM AB SERPL-ACNC: 12.2 SEC — SIGNIFICANT CHANGE UP (ref 9.8–13.1)
RBC # BLD: 5.87 M/UL — HIGH (ref 3.8–5.2)
RBC # FLD: 13.1 % — SIGNIFICANT CHANGE UP (ref 10.3–14.5)
SODIUM SERPL-SCNC: 143 MMOL/L — SIGNIFICANT CHANGE UP (ref 135–145)
TROPONIN T, HIGH SENSITIVITY: < 6 NG/L — SIGNIFICANT CHANGE UP (ref ?–14)
WBC # BLD: 6.8 K/UL — SIGNIFICANT CHANGE UP (ref 3.8–10.5)
WBC # FLD AUTO: 6.8 K/UL — SIGNIFICANT CHANGE UP (ref 3.8–10.5)

## 2018-12-17 PROCEDURE — 70496 CT ANGIOGRAPHY HEAD: CPT | Mod: 26

## 2018-12-17 PROCEDURE — 70498 CT ANGIOGRAPHY NECK: CPT | Mod: 26

## 2018-12-17 PROCEDURE — 99396 PREV VISIT EST AGE 40-64: CPT

## 2018-12-17 PROCEDURE — 99284 EMERGENCY DEPT VISIT MOD MDM: CPT

## 2018-12-17 RX ORDER — ACETAMINOPHEN 500 MG
1000 TABLET ORAL ONCE
Qty: 0 | Refills: 0 | Status: COMPLETED | OUTPATIENT
Start: 2018-12-17 | End: 2018-12-17

## 2018-12-17 RX ORDER — DIPHENHYDRAMINE HCL 50 MG
25 CAPSULE ORAL ONCE
Qty: 0 | Refills: 0 | Status: COMPLETED | OUTPATIENT
Start: 2018-12-17 | End: 2018-12-17

## 2018-12-17 RX ORDER — HYDRALAZINE HCL 50 MG
25 TABLET ORAL ONCE
Qty: 0 | Refills: 0 | Status: COMPLETED | OUTPATIENT
Start: 2018-12-17 | End: 2018-12-17

## 2018-12-17 RX ORDER — LABETALOL HCL 100 MG
10 TABLET ORAL ONCE
Qty: 0 | Refills: 0 | Status: COMPLETED | OUTPATIENT
Start: 2018-12-17 | End: 2018-12-17

## 2018-12-17 RX ORDER — METOCLOPRAMIDE HCL 10 MG
10 TABLET ORAL ONCE
Qty: 0 | Refills: 0 | Status: COMPLETED | OUTPATIENT
Start: 2018-12-17 | End: 2018-12-17

## 2018-12-17 RX ADMIN — Medication 10 MILLIGRAM(S): at 21:33

## 2018-12-17 RX ADMIN — Medication 400 MILLIGRAM(S): at 21:26

## 2018-12-17 RX ADMIN — Medication 10 MILLIGRAM(S): at 21:32

## 2018-12-17 RX ADMIN — Medication 25 MILLIGRAM(S): at 21:32

## 2018-12-17 RX ADMIN — Medication 25 MILLIGRAM(S): at 22:57

## 2018-12-17 NOTE — ED PROVIDER NOTE - CARE PLAN
Principal Discharge DX:	Hypertensive urgency Principal Discharge DX:	Hypertensive urgency  Assessment and plan of treatment:	-- As we spoke about, until you see your primary care doctor, please take your hydralazine 3 times a day instead of 2 times a day.  Your primary doctor may change this when you see him.    -- Please continue taking your other home medications as directed. Do not take extra doses of medication to make up for missed doses. Don't use alcohol when taking any medication (especially antibiotics, Tylenol or pain medication) unless you check with a doctor/pharmacist.   -- Please follow up with your doctor(s) within the next 3 days, but seek medical care sooner if your symptoms worsen. Call for an appointment as soon as possible.  -- You were given results from any tests performed in the Emergency Department which have results available. Show these to your doctor(s). Some of the tests we sent may not have results yet so please call or have your doctor call the Emergency Department to follow up on all results.  -- If you have worsening or concerning symptoms, see your doctor right away or return to the Emergency Department immediately.  Secondary Diagnosis:	Headache Principal Discharge DX:	Hypertensive urgency  Assessment and plan of treatment:	-- As we spoke about, until you see your primary care doctor, please take your hydralazine 3 times a day instead of 2 times a day.  Your primary doctor may change this when you see him.    -- Please continue taking your other home medications as directed. Do not take extra doses of medication to make up for missed doses. Don't use alcohol when taking any medication (especially antibiotics, Tylenol or pain medication) unless you check with a doctor/pharmacist.   -- Please follow up with your doctor(s) within the next 3 days, but seek medical care sooner if your symptoms worsen. Call for an appointment as soon as possible.  -- You were given results from any tests performed in the Emergency Department which have results available. Show these to your doctor(s). Also bring up the thyroid nodule seen on the CT scan.  Some of the tests we sent may not have results yet so please call or have your doctor call the Emergency Department to follow up on all results.  -- If you have worsening or concerning symptoms, see your doctor right away or return to the Emergency Department immediately.  Secondary Diagnosis:	Headache

## 2018-12-17 NOTE — ED ADULT NURSE NOTE - NSIMPLEMENTINTERV_GEN_ALL_ED
Implemented All Universal Safety Interventions:  Widen to call system. Call bell, personal items and telephone within reach. Instruct patient to call for assistance. Room bathroom lighting operational. Non-slip footwear when patient is off stretcher. Physically safe environment: no spills, clutter or unnecessary equipment. Stretcher in lowest position, wheels locked, appropriate side rails in place.

## 2018-12-17 NOTE — ED PROVIDER NOTE - PLAN OF CARE
-- As we spoke about, until you see your primary care doctor, please take your hydralazine 3 times a day instead of 2 times a day.  Your primary doctor may change this when you see him.    -- Please continue taking your other home medications as directed. Do not take extra doses of medication to make up for missed doses. Don't use alcohol when taking any medication (especially antibiotics, Tylenol or pain medication) unless you check with a doctor/pharmacist.   -- Please follow up with your doctor(s) within the next 3 days, but seek medical care sooner if your symptoms worsen. Call for an appointment as soon as possible.  -- You were given results from any tests performed in the Emergency Department which have results available. Show these to your doctor(s). Some of the tests we sent may not have results yet so please call or have your doctor call the Emergency Department to follow up on all results.  -- If you have worsening or concerning symptoms, see your doctor right away or return to the Emergency Department immediately. -- As we spoke about, until you see your primary care doctor, please take your hydralazine 3 times a day instead of 2 times a day.  Your primary doctor may change this when you see him.    -- Please continue taking your other home medications as directed. Do not take extra doses of medication to make up for missed doses. Don't use alcohol when taking any medication (especially antibiotics, Tylenol or pain medication) unless you check with a doctor/pharmacist.   -- Please follow up with your doctor(s) within the next 3 days, but seek medical care sooner if your symptoms worsen. Call for an appointment as soon as possible.  -- You were given results from any tests performed in the Emergency Department which have results available. Show these to your doctor(s). Also bring up the thyroid nodule seen on the CT scan.  Some of the tests we sent may not have results yet so please call or have your doctor call the Emergency Department to follow up on all results.  -- If you have worsening or concerning symptoms, see your doctor right away or return to the Emergency Department immediately.

## 2018-12-17 NOTE — ED PROVIDER NOTE - NSFOLLOWUPINSTRUCTIONS_ED_ALL_ED_FT
-- As we spoke about, until you see your primary care doctor, please take your hydralazine 3 times a day instead of 2 times a day.  Your primary doctor may change this when you see him.    -- Please continue taking your other home medications as directed. Do not take extra doses of medication to make up for missed doses. Don't use alcohol when taking any medication (especially antibiotics, Tylenol or pain medication) unless you check with a doctor/pharmacist.   -- Please follow up with your doctor(s) within the next 3 days, but seek medical care sooner if your symptoms worsen. Call for an appointment as soon as possible.  -- You were given results from any tests performed in the Emergency Department which have results available. Show these to your doctor(s). Also bring up the thyroid nodule seen on the CT scan.  Some of the tests we sent may not have results yet so please call or have your doctor call the Emergency Department to follow up on all results.  -- If you have worsening or concerning symptoms, see your doctor right away or return to the Emergency Department immediately.

## 2018-12-17 NOTE — ED PROVIDER NOTE - ATTENDING CONTRIBUTION TO CARE
Dr. Guaman: I have personally performed a face to face bedside history and physical examination of this patient. I have discussed the history, examination, review of systems, assessment and plan of management with the resident. I have reviewed the electronic medical record and amended it to reflect my history, review of systems, physical exam, assessment and plan.      53F with pmh of HTN, benign brain mass resection, residual droop here with headache and elevated BP. No new focal neuro deficits. +lateral neck pain. No dizziness, f/c, vomiting, neck stiffness, chest pain.      right eye deviation (old)  PERRLA  CTA  b/l  RRR s 1 s2  neck supple, no swelling or tenderness. no bruits.  5/5 strength and normal sensation throughout    possibly hypertensive emergency vs urgency, r/o ICH/SAH, mass, less likely stroke or internal carotid artery dissection. Plan for labs, pain control, CT head noncon, CTA head and neck. Dr. Guaman: I have personally performed a face to face bedside history and physical examination of this patient. I have discussed the history, examination, review of systems, assessment and plan of management with the resident. I have reviewed the electronic medical record and amended it to reflect my history, review of systems, physical exam, assessment and plan.      53F with pmh of HTN, benign brain mass resection, residual droop here with headache and elevated BP. No new focal neuro deficits. +lateral neck pain. No dizziness, f/c, vomiting, neck stiffness, chest pain.      right eye deviation (old)  GEN - NAD; well appearing; A+O x3   HEAD - NC/AT     EYES - EOMI, no conjunctival pallor, no scleral icterus  ENT -   mucous membranes  moist , no discharge      PULM - CTA b/l,  symmetric breath sounds  COR -  RRR, S1 S2, no murmurs  ABD - , ND, NT, soft, no guarding, no rebound, no masses    BACK - no CVA tenderness, nontender spine     EXTREMS - no edema, no deformity, warm and well perfused    SKIN - no rash or bruising      neck supple, no swelling or tenderness. no bruits.  A&Ox3, PERRLA, EOMI, no nystagmus, CN2-12 grossly intact, no pronator drift, normal finger to nose and rapid alternating finger movements, normal sensation and 5/5 strength in all extremities, normal gait, symmetric patellar reflexes    possibly hypertensive emergency vs urgency, r/o ICH/SAH, mass, less likely stroke or internal carotid artery dissection. Plan for labs, pain control, CT head noncon, CTA head and neck.

## 2018-12-17 NOTE — ED ADULT NURSE REASSESSMENT NOTE - NS ED NURSE REASSESS COMMENT FT1
patient remians markedly hypertensive, states headache has resolved, pain is 0/10. nad noted. pending ct read. medicated per orders & md speigel aware of bp, pt asymptomatic at this time, will ctm.

## 2018-12-17 NOTE — ED PROVIDER NOTE - OBJECTIVE STATEMENT
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  headache started ~3 hrs ago while driving from [facility], went to  and was told her blood pressure high and needed to go to ED.  Also complaining of vague sensation in her lateral neck.  Her primary medical doctor told her to double her blood pressure medication today so took hydralazine 25mg and metoprolol 25mg double dose at 3:30 today.  Denies chest pain, shortness of breath, fever, chills, cough, sick contacts, focal weakness, loss of sensation, abdominal pain, palpitations, lightheadedness, dizziness, ear pain, URI symptoms.  No neck stiffness or photophobia.      primary medical doctor: Steve Centeno

## 2018-12-17 NOTE — ED PROVIDER NOTE - PROGRESS NOTE DETAILS
Deniz: Pt's headache is gone. BP again elevated. s/p imaging. Will treat with home dose hydralazine. pending imaging results. if negative and BP improved, likely will d/c home with close PMD f/u. Zvi Dubin, MD (pgy-4) note: Pt remains w/o HA or other sx.  Awaiting CTA read.  A&Ox3, NAD, VSS, pt tolerated PO & ambulated w/steady unassisted gait in the ED.  We discussed the results of ED w/u w/patient (incl. presumptive Emergency Department dx, associated anticipatory guidance, stressing importance of prompt f/u amy for HTN management, return precautions), & gave them a copy of results.  Patient verbalized understanding of ED course & agreed with our f/u recommendations, has decisional making capacity.  Pt & her family @bedside st they will f/u w/PMD within the next 3 days; pt agrees to call today or tomorrow for an appointment. Pt agrees to return to the ED if there is any worsening or concerning symptoms.   HTN Meds: Hydralazine 25mg po BID, Metoprolol 25mg po BID.  Will recommend the pt makes Hydralazine TID until PMD can address her meds. Zvi Dubin, MD (pgy-4) note: discussed CTA results w/ pt & gave verbal & written dc instructions.

## 2018-12-17 NOTE — ED ADULT NURSE NOTE - OBJECTIVE STATEMENT
pt arrives a*ox3 amb to room 1 with extensive pmh of brain tumor s/p multiple resections. pt c/o headache with high bp readings at urgent care, denies any cp or sob, no dizziness, n/v/visual issues, appears comfortable and in nad vss resps even and unlabored, medicated per orders pending CT at this time, md aware of blood pressure, will reassess and ctm.

## 2019-08-09 ENCOUNTER — EMERGENCY (EMERGENCY)
Facility: HOSPITAL | Age: 54
LOS: 1 days | Discharge: LEFT BEFORE DISCHARGE | End: 2019-08-09
Admitting: EMERGENCY MEDICINE

## 2019-08-09 VITALS
RESPIRATION RATE: 18 BRPM | DIASTOLIC BLOOD PRESSURE: 96 MMHG | SYSTOLIC BLOOD PRESSURE: 180 MMHG | OXYGEN SATURATION: 100 % | HEART RATE: 76 BPM | TEMPERATURE: 98 F

## 2019-08-09 DIAGNOSIS — Z98.89 OTHER SPECIFIED POSTPROCEDURAL STATES: Chronic | ICD-10-CM

## 2019-08-09 DIAGNOSIS — Z90.710 ACQUIRED ABSENCE OF BOTH CERVIX AND UTERUS: Chronic | ICD-10-CM

## 2019-08-09 NOTE — ED ADULT TRIAGE NOTE - CHIEF COMPLAINT QUOTE
pt. c/o L eye pain since yesterday, states she had an infection to the L eyelid about 2 weeks ago, saw an eye doc and was prescribed eye drops. Pt. reports sensitivity to light. Hypertensive in triage, states she took her BP meds today and attributes being hypertensive due to pain.

## 2019-09-11 ENCOUNTER — APPOINTMENT (OUTPATIENT)
Dept: INFECTIOUS DISEASE | Facility: CLINIC | Age: 54
End: 2019-09-11

## 2019-12-23 ENCOUNTER — APPOINTMENT (OUTPATIENT)
Dept: OBGYN | Facility: CLINIC | Age: 54
End: 2019-12-23
Payer: COMMERCIAL

## 2019-12-23 ENCOUNTER — APPOINTMENT (OUTPATIENT)
Dept: OBGYN | Facility: CLINIC | Age: 54
End: 2019-12-23

## 2019-12-23 VITALS
WEIGHT: 168.13 LBS | BODY MASS INDEX: 33.01 KG/M2 | HEIGHT: 60 IN | DIASTOLIC BLOOD PRESSURE: 100 MMHG | HEART RATE: 85 BPM | SYSTOLIC BLOOD PRESSURE: 164 MMHG

## 2019-12-23 VITALS — DIASTOLIC BLOOD PRESSURE: 102 MMHG | SYSTOLIC BLOOD PRESSURE: 196 MMHG

## 2019-12-23 PROCEDURE — 99396 PREV VISIT EST AGE 40-64: CPT

## 2019-12-24 NOTE — HISTORY OF PRESENT ILLNESS
[1 Year Ago] : 1 year ago [Overweight] : overweight [Last Colonoscopy ___] : Last colonoscopy [unfilled] [Last Mammogram ___] : Last Mammogram was [unfilled] [Last Pap ___] : Last cervical pap smear was [unfilled] [Sexually Active] : is sexually active [Monogamous (Male Partner)] : is monogamous with a male partner [Regular Exercise] : not exercising regularly

## 2019-12-24 NOTE — CHIEF COMPLAINT
[Annual Visit] : annual visit [FreeTextEntry1] : Patient forgot to take BP medication\par States had mammogram at [facility]

## 2020-06-22 NOTE — ED ADULT TRIAGE NOTE - RESPIRATORY RATE (BREATHS/MIN)
NURSING DISCHARGE NOTE    Discharged Home via [facility]. Accompanied by Support staff  Belongings Taken by patient/family. All criteria meet for discharge. Discharge order placed. Patient in agreement.  Patient safely discharged on floor with trans Consider collaborating with pharmacy to review patient's medication profile  Outcome: Progressing  Goal: Maintains adequate nutritional intake (undernourished)  Description  INTERVENTIONS:  - Monitor percentage of each meal consumed  - Identify factors con 16

## 2021-07-09 NOTE — ED PROVIDER NOTE - DATE/TIME 1
HR=72 bpm, HDGP=277/92 mmhg, SpO2=98.0 %, Resp=12 B/min, EtCO2=33 mmHg, Apnea=4 Seconds, Pain=0, Aguirre=3 17-Jul-2017 07:45

## 2021-08-26 ENCOUNTER — APPOINTMENT (OUTPATIENT)
Dept: OBGYN | Facility: CLINIC | Age: 56
End: 2021-08-26

## 2021-12-27 ENCOUNTER — APPOINTMENT (OUTPATIENT)
Dept: OBGYN | Facility: CLINIC | Age: 56
End: 2021-12-27
Payer: COMMERCIAL

## 2021-12-27 VITALS
DIASTOLIC BLOOD PRESSURE: 92 MMHG | SYSTOLIC BLOOD PRESSURE: 174 MMHG | BODY MASS INDEX: 32.42 KG/M2 | WEIGHT: 165.13 LBS | TEMPERATURE: 97.1 F | HEIGHT: 60 IN

## 2021-12-27 DIAGNOSIS — Z01.419 ENCOUNTER FOR GYNECOLOGICAL EXAMINATION (GENERAL) (ROUTINE) W/OUT ABNORMAL FINDINGS: ICD-10-CM

## 2021-12-27 PROCEDURE — 99396 PREV VISIT EST AGE 40-64: CPT

## 2021-12-27 RX ORDER — AMLODIPINE BESYLATE 5 MG/1
5 TABLET ORAL
Refills: 0 | Status: ACTIVE | COMMUNITY

## 2021-12-27 RX ORDER — VALSARTAN AND HYDROCHLOROTHIAZIDE 160; 12.5 MG/1; MG/1
160-12.5 TABLET, FILM COATED ORAL
Refills: 0 | Status: ACTIVE | COMMUNITY

## 2021-12-31 LAB — HPV HIGH+LOW RISK DNA PNL CVX: NOT DETECTED

## 2022-01-03 LAB — CYTOLOGY CVX/VAG DOC THIN PREP: ABNORMAL

## 2022-12-29 ENCOUNTER — APPOINTMENT (OUTPATIENT)
Dept: OBGYN | Facility: CLINIC | Age: 57
End: 2022-12-29

## 2023-02-26 ENCOUNTER — APPOINTMENT (OUTPATIENT)
Dept: ORTHOPEDIC SURGERY | Facility: CLINIC | Age: 58
End: 2023-02-26
Payer: COMMERCIAL

## 2023-02-26 VITALS — HEIGHT: 60 IN | WEIGHT: 165 LBS | BODY MASS INDEX: 32.39 KG/M2

## 2023-02-26 DIAGNOSIS — G56.03 CARPAL TUNNEL SYNDROM,BILATERAL UPPER LIMBS: ICD-10-CM

## 2023-02-26 PROCEDURE — 73130 X-RAY EXAM OF HAND: CPT | Mod: 50

## 2023-02-26 PROCEDURE — 99203 OFFICE O/P NEW LOW 30 MIN: CPT

## 2023-02-26 NOTE — PHYSICAL EXAM
[Bilateral] : hand bilaterally [Proximal Phalanx] : proximal phalanx [1st] : 1st [NL (75)] : Dorsiflexion 75 degrees [NL (85)] : volarflexion 85 degrees [NL (25)] : radial deviation 25 degrees [NL (40)] : ulnar deviation 40 degrees [NL (90)] : supination 90 degrees [5___] : pinch 5[unfilled]/5 [] : palpable radial pulse [de-identified] : right, cyst felt over the flexor tendon of the right thumb.

## 2023-02-26 NOTE — ASSESSMENT
[FreeTextEntry1] : She is referred for an MRI of the right hand to evaluate for ganglion cyst of the thumb.  She has night splints and is advised to use them.  f/u with Dr Oates after the MRI.

## 2023-02-26 NOTE — HISTORY OF PRESENT ILLNESS
[8] : 8 [5] : 5 [Localized] : localized [Sharp] : sharp [Constant] : constant [de-identified] : 2/26/23: pt is a 58 y/o F presents with right thumb pain; onset of pain on 2022; pt states that she has had pain in the hands for a while; pt states that she now has developed a lump in the base of the thumb; pt states there was no injury; pt states that the pain is worse when she touches the area; pt states that she has been using tylenol for the pain with minimal relief felt; pt state she has a history of carpal tunnel in both wrists [] : no [FreeTextEntry6] : pins and needles, numbess, itching

## 2023-02-27 ENCOUNTER — FORM ENCOUNTER (OUTPATIENT)
Age: 58
End: 2023-02-27

## 2023-02-28 ENCOUNTER — APPOINTMENT (OUTPATIENT)
Dept: MRI IMAGING | Facility: CLINIC | Age: 58
End: 2023-02-28
Payer: COMMERCIAL

## 2023-02-28 ENCOUNTER — APPOINTMENT (OUTPATIENT)
Dept: MRI IMAGING | Facility: CLINIC | Age: 58
End: 2023-02-28

## 2023-02-28 PROCEDURE — 73218 MRI UPPER EXTREMITY W/O DYE: CPT | Mod: RT

## 2023-03-08 ENCOUNTER — APPOINTMENT (OUTPATIENT)
Dept: ORTHOPEDIC SURGERY | Facility: CLINIC | Age: 58
End: 2023-03-08

## 2023-03-21 ENCOUNTER — APPOINTMENT (OUTPATIENT)
Dept: ORTHOPEDIC SURGERY | Facility: CLINIC | Age: 58
End: 2023-03-21

## 2023-03-21 VITALS — WEIGHT: 165 LBS | HEIGHT: 60 IN | BODY MASS INDEX: 32.39 KG/M2

## 2023-03-22 NOTE — ED ADULT NURSE NOTE - FALLEN IN THE PAST
[FreeTextEntry1] : Rec-  re-incorporate vegetables into daily diet and try to meal prep on the weekend when able \par Physical Activity- recommend aerobic exercise 3-4 times per week for 30-45 mins, recommend incorporating strength training 3 times per week. Goal- outdoor bike riding on bike path\par Medication- metformin 2,000 mg daily, topiramate 75 mg daily, and inc phentermine to 30 mg daily\par Scheduled f/u apt on 3/26 at 10 no

## 2023-03-27 ENCOUNTER — APPOINTMENT (OUTPATIENT)
Dept: ORTHOPEDIC SURGERY | Facility: CLINIC | Age: 58
End: 2023-03-27
Payer: COMMERCIAL

## 2023-03-27 VITALS — HEIGHT: 60 IN | WEIGHT: 165 LBS | BODY MASS INDEX: 32.39 KG/M2

## 2023-03-27 DIAGNOSIS — M67.441 GANGLION, RIGHT HAND: ICD-10-CM

## 2023-03-27 PROCEDURE — 99214 OFFICE O/P EST MOD 30 MIN: CPT

## 2023-03-27 NOTE — PHYSICAL EXAM
[Bilateral] : hand bilaterally [1st] : 1st [Proximal Phalanx] : proximal phalanx [NL (75)] : Dorsiflexion 75 degrees [NL (85)] : volarflexion 85 degrees [NL (25)] : radial deviation 25 degrees [NL (40)] : ulnar deviation 40 degrees [NL (90)] : supination 90 degrees [5___] : pinch 5[unfilled]/5 [] : no focal motor deficits [de-identified] : right, cyst felt over the flexor tendon of the right thumb.

## 2023-03-27 NOTE — HISTORY OF PRESENT ILLNESS
[de-identified] : 3/27/23:  Pt has a painful mass in her right thumb.\par \par MRI right hand:\par 1. 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 Mild subcutaneous edema ventral to the flexor pollicis longus tendon at the level of the proximal phalanx \par without well-defined ganglion or mass.\par 3. Clinical correlation and follow up is recommended. Consider inflammatory arthropathy. [] : Post Surgical Visit: no [FreeTextEntry1] : right hand/thumb [de-identified] : SAMPSON Hogue [de-identified] : xrays/mri

## 2023-10-02 NOTE — ED ADULT NURSE NOTE - CAS TRG GEN SKIN CONDITION
I have attempted without success to contact this pt by phone to schedule appt for 10/23/23 and 12/05/23 . Message was left to return call.    
Warm/Dry

## 2024-06-04 NOTE — ED PROVIDER NOTE - EXITCARE/DISCHARGE INSTRUCTIONS
Price (Do Not Change): 0.00 Detail Level: Simple Instructions: This plan will send the code FBSE to the PM system.  DO NOT or CHANGE the price. Launch [employer] and print the 'Prescriptions from this Visit' Report

## 2024-08-05 ENCOUNTER — APPOINTMENT (OUTPATIENT)
Dept: ORTHOPEDIC SURGERY | Facility: CLINIC | Age: 59
End: 2024-08-05

## 2024-09-30 ENCOUNTER — APPOINTMENT (OUTPATIENT)
Dept: OBGYN | Facility: CLINIC | Age: 59
End: 2024-09-30

## 2024-12-11 ENCOUNTER — APPOINTMENT (OUTPATIENT)
Dept: OBGYN | Facility: CLINIC | Age: 59
End: 2024-12-11
Payer: COMMERCIAL

## 2024-12-11 VITALS
WEIGHT: 164 LBS | HEART RATE: 80 BPM | SYSTOLIC BLOOD PRESSURE: 155 MMHG | BODY MASS INDEX: 32.2 KG/M2 | HEIGHT: 60 IN | DIASTOLIC BLOOD PRESSURE: 82 MMHG

## 2024-12-11 DIAGNOSIS — Z01.419 ENCOUNTER FOR GYNECOLOGICAL EXAMINATION (GENERAL) (ROUTINE) W/OUT ABNORMAL FINDINGS: ICD-10-CM

## 2024-12-11 DIAGNOSIS — Z81.8 FAMILY HISTORY OF OTHER MENTAL AND BEHAVIORAL DISORDERS: ICD-10-CM

## 2024-12-11 PROCEDURE — 99396 PREV VISIT EST AGE 40-64: CPT

## 2024-12-11 PROCEDURE — 99459 PELVIC EXAMINATION: CPT

## 2024-12-11 PROCEDURE — G0444 DEPRESSION SCREEN ANNUAL: CPT | Mod: 59

## 2024-12-15 LAB — HPV HIGH+LOW RISK DNA PNL CVX: NOT DETECTED

## 2024-12-17 LAB — CYTOLOGY CVX/VAG DOC THIN PREP: ABNORMAL

## 2025-02-16 ENCOUNTER — NON-APPOINTMENT (OUTPATIENT)
Age: 60
End: 2025-02-16

## 2025-03-08 ENCOUNTER — NON-APPOINTMENT (OUTPATIENT)
Age: 60
End: 2025-03-08

## 2025-03-09 ENCOUNTER — NON-APPOINTMENT (OUTPATIENT)
Age: 60
End: 2025-03-09

## 2025-03-27 ENCOUNTER — APPOINTMENT (OUTPATIENT)
Dept: OBGYN | Facility: CLINIC | Age: 60
End: 2025-03-27

## 2025-08-09 ENCOUNTER — EMERGENCY (EMERGENCY)
Facility: HOSPITAL | Age: 60
LOS: 1 days | End: 2025-08-09
Attending: STUDENT IN AN ORGANIZED HEALTH CARE EDUCATION/TRAINING PROGRAM | Admitting: STUDENT IN AN ORGANIZED HEALTH CARE EDUCATION/TRAINING PROGRAM
Payer: COMMERCIAL

## 2025-08-09 VITALS
OXYGEN SATURATION: 98 % | RESPIRATION RATE: 16 BRPM | TEMPERATURE: 98 F | HEART RATE: 78 BPM | SYSTOLIC BLOOD PRESSURE: 159 MMHG | DIASTOLIC BLOOD PRESSURE: 73 MMHG

## 2025-08-09 VITALS
OXYGEN SATURATION: 97 % | RESPIRATION RATE: 16 BRPM | HEART RATE: 79 BPM | DIASTOLIC BLOOD PRESSURE: 92 MMHG | WEIGHT: 160.06 LBS | SYSTOLIC BLOOD PRESSURE: 171 MMHG | HEIGHT: 60 IN | TEMPERATURE: 98 F

## 2025-08-09 DIAGNOSIS — Z98.89 OTHER SPECIFIED POSTPROCEDURAL STATES: Chronic | ICD-10-CM

## 2025-08-09 DIAGNOSIS — Z90.710 ACQUIRED ABSENCE OF BOTH CERVIX AND UTERUS: Chronic | ICD-10-CM

## 2025-08-09 LAB
ALBUMIN SERPL ELPH-MCNC: 4.4 G/DL — SIGNIFICANT CHANGE UP (ref 3.3–5)
ALP SERPL-CCNC: 79 U/L — SIGNIFICANT CHANGE UP (ref 40–120)
ALT FLD-CCNC: 22 U/L — SIGNIFICANT CHANGE UP (ref 4–33)
ANION GAP SERPL CALC-SCNC: 14 MMOL/L — SIGNIFICANT CHANGE UP (ref 7–14)
APPEARANCE UR: CLEAR — SIGNIFICANT CHANGE UP
AST SERPL-CCNC: 20 U/L — SIGNIFICANT CHANGE UP (ref 4–32)
BACTERIA # UR AUTO: NEGATIVE /HPF — SIGNIFICANT CHANGE UP
BASOPHILS # BLD AUTO: 0.08 K/UL — SIGNIFICANT CHANGE UP (ref 0–0.2)
BASOPHILS NFR BLD AUTO: 1.2 % — SIGNIFICANT CHANGE UP (ref 0–2)
BILIRUB SERPL-MCNC: <0.2 MG/DL — SIGNIFICANT CHANGE UP (ref 0.2–1.2)
BILIRUB UR-MCNC: NEGATIVE — SIGNIFICANT CHANGE UP
BUN SERPL-MCNC: 14 MG/DL — SIGNIFICANT CHANGE UP (ref 7–23)
CALCIUM SERPL-MCNC: 9.3 MG/DL — SIGNIFICANT CHANGE UP (ref 8.4–10.5)
CAST: 0 /LPF — SIGNIFICANT CHANGE UP (ref 0–4)
CHLORIDE SERPL-SCNC: 104 MMOL/L — SIGNIFICANT CHANGE UP (ref 98–107)
CO2 SERPL-SCNC: 25 MMOL/L — SIGNIFICANT CHANGE UP (ref 22–31)
COLOR SPEC: YELLOW — SIGNIFICANT CHANGE UP
CREAT SERPL-MCNC: 0.78 MG/DL — SIGNIFICANT CHANGE UP (ref 0.5–1.3)
DIFF PNL FLD: NEGATIVE — SIGNIFICANT CHANGE UP
EGFR: 89 ML/MIN/1.73M2 — SIGNIFICANT CHANGE UP
EGFR: 89 ML/MIN/1.73M2 — SIGNIFICANT CHANGE UP
EOSINOPHIL # BLD AUTO: 0.27 K/UL — SIGNIFICANT CHANGE UP (ref 0–0.5)
EOSINOPHIL NFR BLD AUTO: 4 % — SIGNIFICANT CHANGE UP (ref 0–6)
GAS PNL BLDV: SIGNIFICANT CHANGE UP
GLUCOSE SERPL-MCNC: 101 MG/DL — HIGH (ref 70–99)
GLUCOSE UR QL: NEGATIVE MG/DL — SIGNIFICANT CHANGE UP
HCT VFR BLD CALC: 44 % — SIGNIFICANT CHANGE UP (ref 34.5–45)
HGB BLD-MCNC: 14.3 G/DL — SIGNIFICANT CHANGE UP (ref 11.5–15.5)
IMM GRANULOCYTES # BLD AUTO: 0 K/UL — SIGNIFICANT CHANGE UP (ref 0–0.07)
IMM GRANULOCYTES NFR BLD AUTO: 0 % — SIGNIFICANT CHANGE UP (ref 0–0.9)
KETONES UR QL: NEGATIVE MG/DL — SIGNIFICANT CHANGE UP
LEUKOCYTE ESTERASE UR-ACNC: ABNORMAL
LIDOCAIN IGE QN: 33 U/L — SIGNIFICANT CHANGE UP (ref 7–60)
LYMPHOCYTES # BLD AUTO: 2.32 K/UL — SIGNIFICANT CHANGE UP (ref 1–3.3)
LYMPHOCYTES NFR BLD AUTO: 34.3 % — SIGNIFICANT CHANGE UP (ref 13–44)
MAGNESIUM SERPL-MCNC: 2.4 MG/DL — SIGNIFICANT CHANGE UP (ref 1.6–2.6)
MCHC RBC-ENTMCNC: 27.6 PG — SIGNIFICANT CHANGE UP (ref 27–34)
MCHC RBC-ENTMCNC: 32.5 G/DL — SIGNIFICANT CHANGE UP (ref 32–36)
MCV RBC AUTO: 84.9 FL — SIGNIFICANT CHANGE UP (ref 80–100)
MONOCYTES # BLD AUTO: 0.65 K/UL — SIGNIFICANT CHANGE UP (ref 0–0.9)
MONOCYTES NFR BLD AUTO: 9.6 % — SIGNIFICANT CHANGE UP (ref 2–14)
NEUTROPHILS # BLD AUTO: 3.45 K/UL — SIGNIFICANT CHANGE UP (ref 1.8–7.4)
NEUTROPHILS NFR BLD AUTO: 50.9 % — SIGNIFICANT CHANGE UP (ref 43–77)
NITRITE UR-MCNC: NEGATIVE — SIGNIFICANT CHANGE UP
NRBC # BLD AUTO: 0 K/UL — SIGNIFICANT CHANGE UP (ref 0–0)
NRBC # FLD: 0 K/UL — SIGNIFICANT CHANGE UP (ref 0–0)
NRBC BLD AUTO-RTO: 0 /100 WBCS — SIGNIFICANT CHANGE UP (ref 0–0)
PH UR: 5.5 — SIGNIFICANT CHANGE UP (ref 5–8)
PHOSPHATE SERPL-MCNC: 3.5 MG/DL — SIGNIFICANT CHANGE UP (ref 2.5–4.5)
PLATELET # BLD AUTO: 220 K/UL — SIGNIFICANT CHANGE UP (ref 150–400)
PMV BLD: 12.1 FL — SIGNIFICANT CHANGE UP (ref 7–13)
POTASSIUM SERPL-MCNC: 3.6 MMOL/L — SIGNIFICANT CHANGE UP (ref 3.5–5.3)
POTASSIUM SERPL-SCNC: 3.6 MMOL/L — SIGNIFICANT CHANGE UP (ref 3.5–5.3)
PROT SERPL-MCNC: 7.7 G/DL — SIGNIFICANT CHANGE UP (ref 6–8.3)
PROT UR-MCNC: NEGATIVE MG/DL — SIGNIFICANT CHANGE UP
RBC # BLD: 5.18 M/UL — SIGNIFICANT CHANGE UP (ref 3.8–5.2)
RBC # FLD: 12.7 % — SIGNIFICANT CHANGE UP (ref 10.3–14.5)
RBC CASTS # UR COMP ASSIST: 1 /HPF — SIGNIFICANT CHANGE UP (ref 0–4)
SODIUM SERPL-SCNC: 143 MMOL/L — SIGNIFICANT CHANGE UP (ref 135–145)
SP GR SPEC: 1.02 — SIGNIFICANT CHANGE UP (ref 1–1.03)
SQUAMOUS # UR AUTO: 3 /HPF — SIGNIFICANT CHANGE UP (ref 0–5)
UROBILINOGEN FLD QL: 0.2 MG/DL — SIGNIFICANT CHANGE UP (ref 0.2–1)
WBC # BLD: 6.77 K/UL — SIGNIFICANT CHANGE UP (ref 3.8–10.5)
WBC # FLD AUTO: 6.77 K/UL — SIGNIFICANT CHANGE UP (ref 3.8–10.5)
WBC UR QL: 2 /HPF — SIGNIFICANT CHANGE UP (ref 0–5)

## 2025-08-09 PROCEDURE — 74177 CT ABD & PELVIS W/CONTRAST: CPT | Mod: 26

## 2025-08-09 PROCEDURE — 99285 EMERGENCY DEPT VISIT HI MDM: CPT

## 2025-08-09 PROCEDURE — 71045 X-RAY EXAM CHEST 1 VIEW: CPT | Mod: 26

## 2025-08-09 RX ORDER — SODIUM CHLORIDE 9 G/1000ML
1000 INJECTION, SOLUTION INTRAVENOUS ONCE
Refills: 0 | Status: COMPLETED | OUTPATIENT
Start: 2025-08-09 | End: 2025-08-09

## 2025-08-09 RX ORDER — DIPHENHYDRAMINE HCL 12.5MG/5ML
50 ELIXIR ORAL ONCE
Refills: 0 | Status: COMPLETED | OUTPATIENT
Start: 2025-08-09 | End: 2025-08-09

## 2025-08-09 RX ORDER — METHYLPREDNISOLONE ACETATE 80 MG/ML
125 INJECTION, SUSPENSION INTRA-ARTICULAR; INTRALESIONAL; INTRAMUSCULAR; SOFT TISSUE ONCE
Refills: 0 | Status: COMPLETED | OUTPATIENT
Start: 2025-08-09 | End: 2025-08-09

## 2025-08-09 RX ADMIN — Medication 50 MILLIGRAM(S): at 18:27

## 2025-08-09 RX ADMIN — SODIUM CHLORIDE 1000 MILLILITER(S): 9 INJECTION, SOLUTION INTRAVENOUS at 17:29

## 2025-08-09 RX ADMIN — METHYLPREDNISOLONE ACETATE 125 MILLIGRAM(S): 80 INJECTION, SUSPENSION INTRA-ARTICULAR; INTRALESIONAL; INTRAMUSCULAR; SOFT TISSUE at 19:18
